# Patient Record
Sex: MALE | Race: WHITE | NOT HISPANIC OR LATINO | URBAN - METROPOLITAN AREA
[De-identification: names, ages, dates, MRNs, and addresses within clinical notes are randomized per-mention and may not be internally consistent; named-entity substitution may affect disease eponyms.]

---

## 2024-10-02 ENCOUNTER — CLAIMS CREATED FROM THE CLAIM WINDOW (OUTPATIENT)
Dept: URBAN - METROPOLITAN AREA MEDICAL CENTER 25 | Facility: MEDICAL CENTER | Age: 64
End: 2024-10-02
Payer: SELF-PAY

## 2024-10-02 ENCOUNTER — LAB OUTSIDE AN ENCOUNTER (OUTPATIENT)
Dept: URBAN - METROPOLITAN AREA CLINIC 19 | Facility: CLINIC | Age: 64
End: 2024-10-02

## 2024-10-02 DIAGNOSIS — R74.8 ABNORMAL LIVER ENZYMES: ICD-10-CM

## 2024-10-02 PROCEDURE — 99254 IP/OBS CNSLTJ NEW/EST MOD 60: CPT | Performed by: INTERNAL MEDICINE

## 2024-10-03 ENCOUNTER — CLAIMS CREATED FROM THE CLAIM WINDOW (OUTPATIENT)
Dept: URBAN - METROPOLITAN AREA MEDICAL CENTER 25 | Facility: MEDICAL CENTER | Age: 64
End: 2024-10-03
Payer: SELF-PAY

## 2024-10-03 DIAGNOSIS — K70.31 ALCOHOLIC CIRRHOSIS OF LIVER WITH ASCITES: ICD-10-CM

## 2024-10-03 DIAGNOSIS — R74.8 ABNORMAL LIVER ENZYMES: ICD-10-CM

## 2024-10-03 PROCEDURE — 99232 SBSQ HOSP IP/OBS MODERATE 35: CPT | Performed by: INTERNAL MEDICINE

## 2024-10-04 ENCOUNTER — CLAIMS CREATED FROM THE CLAIM WINDOW (OUTPATIENT)
Dept: URBAN - METROPOLITAN AREA MEDICAL CENTER 25 | Facility: MEDICAL CENTER | Age: 64
End: 2024-10-04
Payer: SELF-PAY

## 2024-10-04 ENCOUNTER — LAB OUTSIDE AN ENCOUNTER (OUTPATIENT)
Dept: URBAN - METROPOLITAN AREA CLINIC 19 | Facility: CLINIC | Age: 64
End: 2024-10-04

## 2024-10-04 DIAGNOSIS — K70.31 ALCOHOLIC CIRRHOSIS OF LIVER WITH ASCITES: ICD-10-CM

## 2024-10-04 DIAGNOSIS — R74.8 ABNORMAL LIVER ENZYMES: ICD-10-CM

## 2024-10-04 PROCEDURE — 99232 SBSQ HOSP IP/OBS MODERATE 35: CPT | Performed by: INTERNAL MEDICINE

## 2024-10-05 ENCOUNTER — CLAIMS CREATED FROM THE CLAIM WINDOW (OUTPATIENT)
Dept: URBAN - METROPOLITAN AREA MEDICAL CENTER 25 | Facility: MEDICAL CENTER | Age: 64
End: 2024-10-05
Payer: SELF-PAY

## 2024-10-05 DIAGNOSIS — K70.31 ALCOHOLIC CIRRHOSIS OF LIVER WITH ASCITES: ICD-10-CM

## 2024-10-05 DIAGNOSIS — R74.8 ABNORMAL LIVER ENZYMES: ICD-10-CM

## 2024-10-05 PROCEDURE — 99232 SBSQ HOSP IP/OBS MODERATE 35: CPT | Performed by: INTERNAL MEDICINE

## 2024-10-30 ENCOUNTER — DASHBOARD ENCOUNTERS (OUTPATIENT)
Age: 64
End: 2024-10-30

## 2024-10-30 ENCOUNTER — TELEPHONE ENCOUNTER (OUTPATIENT)
Dept: URBAN - METROPOLITAN AREA CLINIC 19 | Facility: CLINIC | Age: 64
End: 2024-10-30

## 2024-10-30 ENCOUNTER — OFFICE VISIT (OUTPATIENT)
Dept: URBAN - METROPOLITAN AREA CLINIC 19 | Facility: CLINIC | Age: 64
End: 2024-10-30
Payer: SELF-PAY

## 2024-10-30 ENCOUNTER — LAB OUTSIDE AN ENCOUNTER (OUTPATIENT)
Dept: URBAN - METROPOLITAN AREA CLINIC 19 | Facility: CLINIC | Age: 64
End: 2024-10-30

## 2024-10-30 VITALS
DIASTOLIC BLOOD PRESSURE: 80 MMHG | WEIGHT: 131.2 LBS | TEMPERATURE: 98.4 F | BODY MASS INDEX: 17.77 KG/M2 | HEIGHT: 72 IN | OXYGEN SATURATION: 100 % | HEART RATE: 98 BPM | SYSTOLIC BLOOD PRESSURE: 110 MMHG

## 2024-10-30 DIAGNOSIS — K70.31 ALCOHOLIC CIRRHOSIS OF LIVER WITH ASCITES: ICD-10-CM

## 2024-10-30 DIAGNOSIS — Z12.11 COLON CANCER SCREENING: ICD-10-CM

## 2024-10-30 PROBLEM — 420054005: Status: ACTIVE | Noted: 2024-10-30

## 2024-10-30 PROCEDURE — 99205 OFFICE O/P NEW HI 60 MIN: CPT | Performed by: INTERNAL MEDICINE

## 2024-10-30 RX ORDER — POLYETHYLENE GLYCOL 3350, SODIUM SULFATE, SODIUM CHLORIDE, POTASSIUM CHLORIDE, ASCORBIC ACID, SODIUM ASCORBATE 140-9-5.2G
AS DIRECTED KIT ORAL
OUTPATIENT
Start: 2024-10-30

## 2024-10-30 RX ORDER — SPIRONOLACTONE 100 MG/1
1 TABLET TABLET, FILM COATED ORAL ONCE A DAY
Qty: 90 TABLET | Refills: 1 | OUTPATIENT
Start: 2024-10-30 | End: 2025-04-28

## 2024-10-30 RX ORDER — ONDANSETRON HYDROCHLORIDE 4 MG/1
1 TABLET TABLET, FILM COATED ORAL ONCE A DAY
Qty: 30 | Refills: 1 | OUTPATIENT
Start: 2024-10-30

## 2024-10-30 RX ORDER — FUROSEMIDE 40 MG/1
1 TABLET TABLET ORAL ONCE A DAY
Qty: 90 TABLET | Refills: 1 | OUTPATIENT
Start: 2024-10-30 | End: 2025-04-28

## 2024-10-30 RX ORDER — LACTULOSE 10 G/15ML
15 ML AS NEEDED SOLUTION ORAL ONCE A DAY
Qty: 1350 ML | Refills: 3 | OUTPATIENT
Start: 2024-10-30 | End: 2025-10-25

## 2024-10-30 NOTE — HPI-TODAY'S VISIT:
Mr. Mitchell is a 64 year old male with history of throat cancer in remission who was admitted 10/2/2024 to 10/7/2024 for decompensated cirrhosis.   On 10/2/2024 CT A/P hepatic protocol showed hepatic cirrhosis and evidence of portal venous hypertension with splenomegaly, collateral vascular formation and a large amount of abdominal and pelvic ascites, anasarca, bilateral pleural effusions, left greater than right.   On 10/4/2024 ascites was consistent with portal hypertension ascites from cirrhosis without SBP.  On 10/4/2024 labs showed ASMA negative, ceruloplasmin 25.3, Iron 65, iron satur 39%,    On 10/7/2024 labs showed AST 70, ALT 36, Bilirubin 5.7, alk phos 261, Albumin 2.6, Cr 0.6, Na 135, INR 1.4, Hgb 8.9, .1, and platelet 140.   He reports his last drink was end of 8/2024.

## 2024-11-06 ENCOUNTER — TELEPHONE ENCOUNTER (OUTPATIENT)
Dept: URBAN - METROPOLITAN AREA CLINIC 98 | Facility: CLINIC | Age: 64
End: 2024-11-06

## 2024-11-06 RX ORDER — SPIRONOLACTONE 100 MG/1
1 TABLET TABLET, FILM COATED ORAL ONCE A DAY
Qty: 90 TABLET | Refills: 1
Start: 2024-10-30 | End: 2025-05-05

## 2024-11-11 ENCOUNTER — TELEPHONE ENCOUNTER (OUTPATIENT)
Dept: URBAN - METROPOLITAN AREA CLINIC 19 | Facility: CLINIC | Age: 64
End: 2024-11-11

## 2024-11-13 ENCOUNTER — TELEPHONE ENCOUNTER (OUTPATIENT)
Dept: URBAN - METROPOLITAN AREA CLINIC 19 | Facility: CLINIC | Age: 64
End: 2024-11-13

## 2024-11-13 RX ORDER — MIDODRINE HYDROCHLORIDE 5 MG/1
1 TABLET HOLD FOR SYSTOLIC BLOOD PRESSURE OVER 140 TABLET ORAL
Qty: 90 | Refills: 0 | OUTPATIENT
Start: 2024-11-13

## 2024-11-13 RX ORDER — POTASSIUM CHLORIDE 1500 MG/1
1 TABLET WITH FOOD TABLET, EXTENDED RELEASE ORAL ONCE A DAY
Qty: 30 | Refills: 0 | OUTPATIENT
Start: 2024-11-13 | End: 2024-12-13

## 2024-11-15 ENCOUNTER — OFFICE VISIT (OUTPATIENT)
Dept: URBAN - METROPOLITAN AREA TELEHEALTH 2 | Facility: TELEHEALTH | Age: 64
End: 2024-11-15
Payer: COMMERCIAL

## 2024-11-15 VITALS — WEIGHT: 144 LBS | HEIGHT: 72 IN | BODY MASS INDEX: 19.5 KG/M2

## 2024-11-15 DIAGNOSIS — K70.31 ALCOHOLIC CIRRHOSIS OF LIVER WITH ASCITES: ICD-10-CM

## 2024-11-15 PROCEDURE — 97802 MEDICAL NUTRITION INDIV IN: CPT | Performed by: DIETITIAN, REGISTERED

## 2024-11-15 RX ORDER — LACTULOSE 10 G/15ML
15 ML AS NEEDED SOLUTION ORAL ONCE A DAY
Qty: 1350 ML | Refills: 3 | Status: ACTIVE | COMMUNITY
Start: 2024-10-30 | End: 2025-10-25

## 2024-11-15 RX ORDER — POTASSIUM CHLORIDE 1500 MG/1
1 TABLET WITH FOOD TABLET, EXTENDED RELEASE ORAL ONCE A DAY
Qty: 30 | Refills: 0 | Status: ACTIVE | COMMUNITY
Start: 2024-11-13 | End: 2024-12-13

## 2024-11-15 RX ORDER — MIDODRINE HYDROCHLORIDE 5 MG/1
1 TABLET HOLD FOR SYSTOLIC BLOOD PRESSURE OVER 140 TABLET ORAL
Qty: 90 | Refills: 0 | Status: ACTIVE | COMMUNITY
Start: 2024-11-13

## 2024-11-15 RX ORDER — POLYETHYLENE GLYCOL 3350, SODIUM SULFATE, SODIUM CHLORIDE, POTASSIUM CHLORIDE, ASCORBIC ACID, SODIUM ASCORBATE 140-9-5.2G
AS DIRECTED KIT ORAL
Status: ACTIVE | COMMUNITY
Start: 2024-10-30

## 2024-11-15 RX ORDER — ONDANSETRON HYDROCHLORIDE 4 MG/1
1 TABLET TABLET, FILM COATED ORAL ONCE A DAY
Qty: 30 | Refills: 1 | Status: ACTIVE | COMMUNITY
Start: 2024-10-30

## 2024-11-15 RX ORDER — FUROSEMIDE 40 MG/1
1 TABLET TABLET ORAL ONCE A DAY
Qty: 90 TABLET | Refills: 1 | Status: ACTIVE | COMMUNITY
Start: 2024-10-30 | End: 2025-04-28

## 2024-11-15 RX ORDER — SPIRONOLACTONE 100 MG/1
1 TABLET TABLET, FILM COATED ORAL ONCE A DAY
Qty: 90 TABLET | Refills: 1 | Status: ACTIVE | COMMUNITY
Start: 2024-10-30 | End: 2025-05-05

## 2024-12-04 ENCOUNTER — OFFICE VISIT (OUTPATIENT)
Dept: URBAN - METROPOLITAN AREA TELEHEALTH 2 | Facility: TELEHEALTH | Age: 64
End: 2024-12-04

## 2024-12-04 RX ORDER — SPIRONOLACTONE 100 MG/1
1 TABLET TABLET, FILM COATED ORAL ONCE A DAY
Qty: 90 TABLET | Refills: 1 | Status: ACTIVE | COMMUNITY
Start: 2024-10-30 | End: 2025-05-05

## 2024-12-04 RX ORDER — FUROSEMIDE 40 MG/1
1 TABLET TABLET ORAL ONCE A DAY
Qty: 90 TABLET | Refills: 1 | Status: ACTIVE | COMMUNITY
Start: 2024-10-30 | End: 2025-04-28

## 2024-12-04 RX ORDER — ONDANSETRON HYDROCHLORIDE 4 MG/1
1 TABLET TABLET, FILM COATED ORAL ONCE A DAY
Qty: 30 | Refills: 1 | Status: ACTIVE | COMMUNITY
Start: 2024-10-30

## 2024-12-04 RX ORDER — MIDODRINE HYDROCHLORIDE 5 MG/1
1 TABLET HOLD FOR SYSTOLIC BLOOD PRESSURE OVER 140 TABLET ORAL
Qty: 90 | Refills: 0 | Status: ACTIVE | COMMUNITY
Start: 2024-11-13

## 2024-12-04 RX ORDER — POTASSIUM CHLORIDE 1500 MG/1
1 TABLET WITH FOOD TABLET, EXTENDED RELEASE ORAL ONCE A DAY
Qty: 30 | Refills: 0 | Status: ACTIVE | COMMUNITY
Start: 2024-11-13 | End: 2024-12-13

## 2024-12-04 RX ORDER — LACTULOSE 10 G/15ML
15 ML AS NEEDED SOLUTION ORAL ONCE A DAY
Qty: 1350 ML | Refills: 3 | Status: ACTIVE | COMMUNITY
Start: 2024-10-30 | End: 2025-10-25

## 2024-12-04 RX ORDER — POLYETHYLENE GLYCOL 3350, SODIUM SULFATE, SODIUM CHLORIDE, POTASSIUM CHLORIDE, ASCORBIC ACID, SODIUM ASCORBATE 140-9-5.2G
AS DIRECTED KIT ORAL
Status: ACTIVE | COMMUNITY
Start: 2024-10-30

## 2024-12-11 ENCOUNTER — TELEPHONE ENCOUNTER (OUTPATIENT)
Dept: URBAN - METROPOLITAN AREA CLINIC 19 | Facility: CLINIC | Age: 64
End: 2024-12-11

## 2024-12-11 ENCOUNTER — OFFICE VISIT (OUTPATIENT)
Dept: URBAN - METROPOLITAN AREA TELEHEALTH 2 | Facility: TELEHEALTH | Age: 64
End: 2024-12-11
Payer: COMMERCIAL

## 2024-12-11 DIAGNOSIS — K70.31 ALCOHOLIC CIRRHOSIS OF LIVER WITH ASCITES: ICD-10-CM

## 2024-12-11 PROCEDURE — 97803 MED NUTRITION INDIV SUBSEQ: CPT | Performed by: DIETITIAN, REGISTERED

## 2024-12-11 RX ORDER — POTASSIUM CHLORIDE 1500 MG/1
1 TABLET WITH FOOD TABLET, EXTENDED RELEASE ORAL ONCE A DAY
Qty: 30 | Refills: 0 | Status: ACTIVE | COMMUNITY
Start: 2024-11-13 | End: 2024-12-13

## 2024-12-11 RX ORDER — SPIRONOLACTONE 100 MG/1
1 TABLET TABLET, FILM COATED ORAL ONCE A DAY
Qty: 90 TABLET | Refills: 1 | Status: ACTIVE | COMMUNITY
Start: 2024-10-30 | End: 2025-05-05

## 2024-12-11 RX ORDER — MIDODRINE HYDROCHLORIDE 5 MG/1
1 TABLET HOLD FOR SYSTOLIC BLOOD PRESSURE OVER 140 TABLET ORAL
Qty: 90 | Refills: 0 | Status: ACTIVE | COMMUNITY
Start: 2024-11-13

## 2024-12-11 RX ORDER — POLYETHYLENE GLYCOL 3350, SODIUM SULFATE, SODIUM CHLORIDE, POTASSIUM CHLORIDE, ASCORBIC ACID, SODIUM ASCORBATE 140-9-5.2G
AS DIRECTED KIT ORAL
Status: ACTIVE | COMMUNITY
Start: 2024-10-30

## 2024-12-11 RX ORDER — FUROSEMIDE 40 MG/1
1 TABLET TABLET ORAL ONCE A DAY
Qty: 90 TABLET | Refills: 1 | Status: ACTIVE | COMMUNITY
Start: 2024-10-30 | End: 2025-04-28

## 2024-12-11 RX ORDER — ONDANSETRON HYDROCHLORIDE 4 MG/1
1 TABLET TABLET, FILM COATED ORAL ONCE A DAY
Qty: 30 | Refills: 1 | Status: ACTIVE | COMMUNITY
Start: 2024-10-30

## 2024-12-11 RX ORDER — LACTULOSE 10 G/15ML
15 ML AS NEEDED SOLUTION ORAL ONCE A DAY
Qty: 1350 ML | Refills: 3 | Status: ACTIVE | COMMUNITY
Start: 2024-10-30 | End: 2025-10-25

## 2024-12-12 ENCOUNTER — OFFICE VISIT (OUTPATIENT)
Dept: URBAN - METROPOLITAN AREA MEDICAL CENTER 25 | Facility: MEDICAL CENTER | Age: 64
End: 2024-12-12
Payer: COMMERCIAL

## 2024-12-12 DIAGNOSIS — D12.2 ADENOMA OF ASCENDING COLON: ICD-10-CM

## 2024-12-12 DIAGNOSIS — K31.89 ACHYLIA: ICD-10-CM

## 2024-12-12 DIAGNOSIS — Z12.11 COLON CANCER SCREENING: ICD-10-CM

## 2024-12-12 DIAGNOSIS — D12.4 ADENOMA OF DESCENDING COLON: ICD-10-CM

## 2024-12-12 DIAGNOSIS — K74.69 CIRRHOSIS, CRYPTOGENIC: ICD-10-CM

## 2024-12-12 DIAGNOSIS — D12.8 ADENOMATOUS POLYP OF RECTUM: ICD-10-CM

## 2024-12-12 DIAGNOSIS — K76.6 CLINICALLY SIGNIFICANT PORTAL HYPERTENSION: ICD-10-CM

## 2024-12-12 PROCEDURE — 43239 EGD BIOPSY SINGLE/MULTIPLE: CPT | Performed by: INTERNAL MEDICINE

## 2024-12-12 PROCEDURE — 45385 COLONOSCOPY W/LESION REMOVAL: CPT | Performed by: INTERNAL MEDICINE

## 2024-12-12 RX ORDER — SPIRONOLACTONE 100 MG/1
1 TABLET TABLET, FILM COATED ORAL ONCE A DAY
Qty: 90 TABLET | Refills: 1 | Status: ACTIVE | COMMUNITY
Start: 2024-10-30 | End: 2025-05-05

## 2024-12-12 RX ORDER — MIDODRINE HYDROCHLORIDE 5 MG/1
1 TABLET HOLD FOR SYSTOLIC BLOOD PRESSURE OVER 140 TABLET ORAL
Qty: 90 | Refills: 0 | Status: ACTIVE | COMMUNITY
Start: 2024-11-13

## 2024-12-12 RX ORDER — ONDANSETRON HYDROCHLORIDE 4 MG/1
1 TABLET TABLET, FILM COATED ORAL ONCE A DAY
Qty: 30 | Refills: 1 | Status: ACTIVE | COMMUNITY
Start: 2024-10-30

## 2024-12-12 RX ORDER — FUROSEMIDE 40 MG/1
1 TABLET TABLET ORAL ONCE A DAY
Qty: 90 TABLET | Refills: 1 | Status: ACTIVE | COMMUNITY
Start: 2024-10-30 | End: 2025-04-28

## 2024-12-12 RX ORDER — LACTULOSE 10 G/15ML
15 ML AS NEEDED SOLUTION ORAL ONCE A DAY
Qty: 1350 ML | Refills: 3 | Status: ACTIVE | COMMUNITY
Start: 2024-10-30 | End: 2025-10-25

## 2024-12-12 RX ORDER — POTASSIUM CHLORIDE 1500 MG/1
1 TABLET WITH FOOD TABLET, EXTENDED RELEASE ORAL ONCE A DAY
Qty: 30 | Refills: 0 | Status: ACTIVE | COMMUNITY
Start: 2024-11-13 | End: 2024-12-13

## 2024-12-12 RX ORDER — POLYETHYLENE GLYCOL 3350, SODIUM SULFATE, SODIUM CHLORIDE, POTASSIUM CHLORIDE, ASCORBIC ACID, SODIUM ASCORBATE 140-9-5.2G
AS DIRECTED KIT ORAL
Status: ACTIVE | COMMUNITY
Start: 2024-10-30

## 2024-12-14 ENCOUNTER — TELEPHONE ENCOUNTER (OUTPATIENT)
Dept: URBAN - METROPOLITAN AREA CLINIC 19 | Facility: CLINIC | Age: 64
End: 2024-12-14

## 2025-01-10 ENCOUNTER — OFFICE VISIT (OUTPATIENT)
Dept: URBAN - METROPOLITAN AREA CLINIC 19 | Facility: CLINIC | Age: 65
End: 2025-01-10

## 2025-03-03 ENCOUNTER — LAB OUTSIDE AN ENCOUNTER (OUTPATIENT)
Dept: URBAN - METROPOLITAN AREA CLINIC 19 | Facility: CLINIC | Age: 65
End: 2025-03-03

## 2025-03-03 ENCOUNTER — OFFICE VISIT (OUTPATIENT)
Dept: URBAN - METROPOLITAN AREA CLINIC 19 | Facility: CLINIC | Age: 65
End: 2025-03-03
Payer: COMMERCIAL

## 2025-03-03 VITALS
WEIGHT: 142.6 LBS | BODY MASS INDEX: 19.31 KG/M2 | SYSTOLIC BLOOD PRESSURE: 115 MMHG | TEMPERATURE: 98.6 F | DIASTOLIC BLOOD PRESSURE: 80 MMHG | HEIGHT: 72 IN

## 2025-03-03 DIAGNOSIS — K70.31 ALCOHOLIC CIRRHOSIS OF LIVER WITH ASCITES: ICD-10-CM

## 2025-03-03 DIAGNOSIS — Z86.0100 PERSONAL HISTORY OF COLONIC POLYPS: ICD-10-CM

## 2025-03-03 DIAGNOSIS — R76.8 HEPATITIS B CORE ANTIBODY POSITIVE: ICD-10-CM

## 2025-03-03 PROCEDURE — 99215 OFFICE O/P EST HI 40 MIN: CPT | Performed by: INTERNAL MEDICINE

## 2025-03-03 RX ORDER — ONDANSETRON HYDROCHLORIDE 4 MG/1
1 TABLET TABLET, FILM COATED ORAL ONCE A DAY
Qty: 30 | Refills: 1 | Status: ACTIVE | COMMUNITY
Start: 2024-10-30

## 2025-03-03 RX ORDER — POLYETHYLENE GLYCOL 3350, SODIUM SULFATE, SODIUM CHLORIDE, POTASSIUM CHLORIDE, ASCORBIC ACID, SODIUM ASCORBATE 140-9-5.2G
AS DIRECTED KIT ORAL
Status: ACTIVE | COMMUNITY
Start: 2024-10-30

## 2025-03-03 RX ORDER — SPIRONOLACTONE 50 MG/1
1 TABLET TABLET, FILM COATED ORAL ONCE A DAY
Qty: 90 TABLET | Refills: 3 | OUTPATIENT
Start: 2025-03-03 | End: 2026-02-26

## 2025-03-03 RX ORDER — MIDODRINE HYDROCHLORIDE 5 MG/1
1 TABLET HOLD FOR SYSTOLIC BLOOD PRESSURE OVER 140 TABLET ORAL
Qty: 90 | Refills: 0 | Status: ACTIVE | COMMUNITY
Start: 2024-11-13

## 2025-03-03 RX ORDER — LACTULOSE 10 G/15ML
15 ML AS NEEDED SOLUTION ORAL ONCE A DAY
Qty: 1350 ML | Refills: 3 | Status: ACTIVE | COMMUNITY
Start: 2024-10-30 | End: 2025-10-25

## 2025-03-03 RX ORDER — FUROSEMIDE 40 MG/1
1 TABLET TABLET ORAL ONCE A DAY
Qty: 90 TABLET | Refills: 1 | Status: ACTIVE | COMMUNITY
Start: 2024-10-30 | End: 2025-04-28

## 2025-03-03 RX ORDER — SPIRONOLACTONE 100 MG/1
1 TABLET TABLET, FILM COATED ORAL ONCE A DAY
Qty: 90 TABLET | Refills: 1 | Status: ACTIVE | COMMUNITY
Start: 2024-10-30 | End: 2025-05-05

## 2025-03-03 RX ORDER — FUROSEMIDE 20 MG/1
1 TABLET TABLET ORAL ONCE A DAY
Qty: 90 TABLET | Refills: 3 | OUTPATIENT
Start: 2025-03-03 | End: 2026-02-26

## 2025-03-03 NOTE — HPI-TODAY'S VISIT:
Mr. Mitchell is a 64 year old male with throat cancer in remission on 10/30/2024 for decompensated cirrhosis.   On 10/30/2024 labs showed WBC 9.6, Hgb 11.9, Plt 172, INR 1.1, Cr 0.82, Alb 3.2, AST 63, ALT 34, T bili 5.8, Alk phos 315, Albumin 3.2, AFP 5.0 (normal <6.1), Hep A Ab reactive, Hep B s Ag nonreactive Hep B c Ab reactive, Hep B s Ab reactive, Hepatitis C nonreactive, A1AT phenotype MM.   On 12/11/2024 he saw dietician Ana who helped with low sodium nutrition dense diet.   On 12/12/2024 he had an EGD and colonoscopy performed. On 12/12/2024 EGD showed moderate portal hypertensive gastropathy and mild erythema in the gastric antrum. The colonoscopy wshowed a 7mm rectal polyp, 7mm descending colon polyp, and 4mm ascending colon polyp.   Pathology of the gastric biopsies were negative for H. pylori, pathology of the ascending colon polyp showed a sessile serrated adenoma, descending colon polyp was a tubular adenoma, and the rectal polyp was a tubullovillous adenoma.   Repeat colonoscopy was recommended in 3 years.   Prior history is summarized below: -He reports his last drink was end of 8/2024. -On 10/2/2024 CT A/P hepatic protocol showed hepatic cirrhosis and evidence of portal venous hypertension with splenomegaly, collateral vascular formation and a large amount of abdominal and pelvic ascites, anasarca, bilateral pleural effusions, left greater than right.  -On 10/4/2024 ascites was consistent with portal hypertension ascites from cirrhosis without SBP. -On 10/4/2024 labs showed ASMA negative, ceruloplasmin 25.3, Iron 65, iron sat 39%,    -On 10/7/2024 labs showed AST 70, ALT 36, Bilirubin 5.7, alk phos 261, Albumin 2.6, Cr 0.6, Na 135, INR 1.4, Hgb 8.9, .1, and platelet 140.

## 2025-03-05 LAB
A/G RATIO: 1.2
AFP, SERUM, TUMOR MARKER: 5.5
ALBUMIN: 3.7
ALKALINE PHOSPHATASE: 220
ALT (SGPT): 50
AST (SGOT): 70
BILIRUBIN, TOTAL: 3.1
BUN/CREATININE RATIO: (no result)
BUN: 12
CALCIUM: 9.3
CARBON DIOXIDE, TOTAL: 26
CHLORIDE: 99
CREATININE: 0.8
EGFR: 99
GLOBULIN, TOTAL: 3
GLUCOSE: 94
HEMATOCRIT: 39.8
HEMOGLOBIN: 13.8
HEPATITIS B VIRUS DNA: NOT DETECTED
HEPATITIS B VIRUS DNA: NOT DETECTED
INR: 1
MCH: 32.3
MCHC: 34.7
MCV: 93.2
MPV: 10.4
PLATELET COUNT: 129
POTASSIUM: 3.9
PROTEIN, TOTAL: 6.7
PT: 10.8
RDW: 13
RED BLOOD CELL COUNT: 4.27
SODIUM: 136
WHITE BLOOD CELL COUNT: 6.9

## 2025-03-10 ENCOUNTER — OFFICE VISIT (OUTPATIENT)
Dept: URBAN - METROPOLITAN AREA CLINIC 19 | Facility: CLINIC | Age: 65
End: 2025-03-10

## 2025-03-21 ENCOUNTER — OFFICE VISIT (OUTPATIENT)
Dept: URBAN - METROPOLITAN AREA CLINIC 18 | Facility: CLINIC | Age: 65
End: 2025-03-21
Payer: COMMERCIAL

## 2025-03-21 DIAGNOSIS — K70.31 ALCOHOLIC CIRRHOSIS OF LIVER WITH ASCITES: ICD-10-CM

## 2025-03-21 DIAGNOSIS — K82.4 GALL BLADDER POLYP: ICD-10-CM

## 2025-03-21 DIAGNOSIS — K82.8 THICKENING OF WALL OF GALLBLADDER: ICD-10-CM

## 2025-03-21 PROCEDURE — 76705 ECHO EXAM OF ABDOMEN: CPT | Performed by: INTERNAL MEDICINE

## 2025-03-21 RX ORDER — FUROSEMIDE 40 MG/1
1 TABLET TABLET ORAL ONCE A DAY
Qty: 90 TABLET | Refills: 1 | Status: ACTIVE | COMMUNITY
Start: 2024-10-30 | End: 2025-04-28

## 2025-03-21 RX ORDER — FUROSEMIDE 20 MG/1
1 TABLET TABLET ORAL ONCE A DAY
Qty: 90 TABLET | Refills: 3 | Status: ACTIVE | COMMUNITY
Start: 2025-03-03 | End: 2026-02-26

## 2025-03-21 RX ORDER — SPIRONOLACTONE 50 MG/1
1 TABLET TABLET, FILM COATED ORAL ONCE A DAY
Qty: 90 TABLET | Refills: 3 | Status: ACTIVE | COMMUNITY
Start: 2025-03-03 | End: 2026-02-26

## 2025-03-21 RX ORDER — LACTULOSE 10 G/15ML
15 ML AS NEEDED SOLUTION ORAL ONCE A DAY
Qty: 1350 ML | Refills: 3 | Status: ACTIVE | COMMUNITY
Start: 2024-10-30 | End: 2025-10-25

## 2025-03-21 RX ORDER — MIDODRINE HYDROCHLORIDE 5 MG/1
1 TABLET HOLD FOR SYSTOLIC BLOOD PRESSURE OVER 140 TABLET ORAL
Qty: 90 | Refills: 0 | Status: ACTIVE | COMMUNITY
Start: 2024-11-13

## 2025-03-21 RX ORDER — POLYETHYLENE GLYCOL 3350, SODIUM SULFATE, SODIUM CHLORIDE, POTASSIUM CHLORIDE, ASCORBIC ACID, SODIUM ASCORBATE 140-9-5.2G
AS DIRECTED KIT ORAL
Status: ACTIVE | COMMUNITY
Start: 2024-10-30

## 2025-03-21 RX ORDER — SPIRONOLACTONE 100 MG/1
1 TABLET TABLET, FILM COATED ORAL ONCE A DAY
Qty: 90 TABLET | Refills: 1 | Status: ACTIVE | COMMUNITY
Start: 2024-10-30 | End: 2025-05-05

## 2025-03-21 RX ORDER — ONDANSETRON HYDROCHLORIDE 4 MG/1
1 TABLET TABLET, FILM COATED ORAL ONCE A DAY
Qty: 30 | Refills: 1 | Status: ACTIVE | COMMUNITY
Start: 2024-10-30

## 2025-04-04 ENCOUNTER — OFFICE VISIT (OUTPATIENT)
Dept: URBAN - METROPOLITAN AREA CLINIC 19 | Facility: CLINIC | Age: 65
End: 2025-04-04
Payer: COMMERCIAL

## 2025-04-04 DIAGNOSIS — K70.30 ALCOHOLIC CIRRHOSIS: ICD-10-CM

## 2025-04-04 DIAGNOSIS — R12 HEARTBURN: ICD-10-CM

## 2025-04-04 PROCEDURE — 99213 OFFICE O/P EST LOW 20 MIN: CPT | Performed by: NURSE PRACTITIONER

## 2025-04-04 RX ORDER — SPIRONOLACTONE 100 MG/1
1 TABLET TABLET, FILM COATED ORAL ONCE A DAY
Qty: 90 TABLET | Refills: 1 | Status: ACTIVE | COMMUNITY
Start: 2024-10-30 | End: 2025-05-05

## 2025-04-04 RX ORDER — FUROSEMIDE 40 MG/1
1 TABLET TABLET ORAL ONCE A DAY
Qty: 90 TABLET | Refills: 1 | Status: ACTIVE | COMMUNITY
Start: 2024-10-30 | End: 2025-04-28

## 2025-04-04 RX ORDER — MIDODRINE HYDROCHLORIDE 5 MG/1
1 TABLET HOLD FOR SYSTOLIC BLOOD PRESSURE OVER 140 TABLET ORAL
Qty: 90 | Refills: 0 | Status: ON HOLD | COMMUNITY
Start: 2024-11-13

## 2025-04-04 RX ORDER — PANTOPRAZOLE SODIUM 20 MG/1
1 TABLET 1/2 TO 1 HOUR BEFORE MORNING MEAL TABLET, DELAYED RELEASE ORAL ONCE A DAY
Qty: 90 TABLET | Refills: 3 | OUTPATIENT
Start: 2025-04-04

## 2025-04-04 RX ORDER — LACTULOSE 10 G/15ML
15 ML AS NEEDED SOLUTION ORAL ONCE A DAY
Qty: 1350 ML | Refills: 3 | Status: ON HOLD | COMMUNITY
Start: 2024-10-30 | End: 2025-10-25

## 2025-04-04 RX ORDER — ONDANSETRON HYDROCHLORIDE 4 MG/1
1 TABLET TABLET, FILM COATED ORAL ONCE A DAY
Qty: 30 | Refills: 1 | Status: ON HOLD | COMMUNITY
Start: 2024-10-30

## 2025-04-04 NOTE — HPI-TODAY'S VISIT:
Mr. Mitchell is a 64 year old male with throat cancer in remission, cirrhosis who presents for follow-up.  Last seen 3/3/2025 with Dr. Hidalgo.  3/3/2025 labs: AFP negative.  WBC 6.9, Hg 13.8, , INR 1.0, T. bili 3.1, , AST 70, ALT 50.  Hepatitis B DNA PCR negative NaMELD = 11   3/21/2025 RUQ US: Cirrhosis, no suspicious liver lesion.  Small 3 mm pedunculated gallbladder polyp versus adherent gallstone without suspicious features.  Mild diffuse gallbladder wall thickening likely secondary to liver disease and/or underdistention.  He ran out of Pantoprazole last month and has been experiencing more heartburn lately. Has been maintaining low sodium diet, no etoh, compliant with diuretics. No swelling or jaundice. No abdominal pain. Overall feels well.        Prior history is summarized below: -He reports his last drink was end of 8/2024. -On 10/2/2024 CT A/P hepatic protocol showed hepatic cirrhosis and evidence of portal venous hypertension with splenomegaly, collateral vascular formation and a large amount of abdominal and pelvic ascites, anasarca, bilateral pleural effusions, left greater than right.  -On 10/4/2024 ascites was consistent with portal hypertension ascites from cirrhosis without SBP. -On 10/4/2024 labs showed ASMA negative, ceruloplasmin 25.3, Iron 65, iron sat 39%,    -On 10/7/2024 labs showed AST 70, ALT 36, Bilirubin 5.7, alk phos 261, Albumin 2.6, Cr 0.6, Na 135, INR 1.4, Hgb 8.9, .1, and platelet 140. On 10/30/2024 labs showed WBC 9.6, Hgb 11.9, Plt 172, INR 1.1, Cr 0.82, Alb 3.2, AST 63, ALT 34, T bili 5.8, Alk phos 315, Albumin 3.2, AFP 5.0 (normal <6.1), Hep A Ab reactive, Hep B s Ag nonreactive Hep B c Ab reactive, Hep B s Ab reactive, Hepatitis C nonreactive, A1AT phenotype MM.   On 12/11/2024 he saw dietician Ana who helped with low sodium nutrition dense diet.   On 12/12/2024 he had an EGD and colonoscopy performed. On 12/12/2024 EGD showed moderate portal hypertensive gastropathy and mild erythema in the gastric antrum. The colonoscopy wshowed a 7mm rectal polyp, 7mm descending colon polyp, and 4mm ascending colon polyp.   Pathology of the gastric biopsies were negative for H. pylori, pathology of the ascending colon polyp showed a sessile serrated adenoma, descending colon polyp was a tubular adenoma, and the rectal polyp was a tubullovillous adenoma.   Repeat colonoscopy was recommended in 3 years..

## 2025-05-27 ENCOUNTER — TELEPHONE ENCOUNTER (OUTPATIENT)
Dept: URBAN - METROPOLITAN AREA CLINIC 19 | Facility: CLINIC | Age: 65
End: 2025-05-27

## 2025-06-02 ENCOUNTER — OFFICE VISIT (OUTPATIENT)
Dept: URBAN - METROPOLITAN AREA CLINIC 19 | Facility: CLINIC | Age: 65
End: 2025-06-02

## 2025-06-16 ENCOUNTER — OFFICE VISIT (OUTPATIENT)
Dept: URBAN - METROPOLITAN AREA CLINIC 19 | Facility: CLINIC | Age: 65
End: 2025-06-16

## 2025-06-27 ENCOUNTER — OFFICE VISIT (OUTPATIENT)
Dept: URBAN - METROPOLITAN AREA CLINIC 19 | Facility: CLINIC | Age: 65
End: 2025-06-27
Payer: COMMERCIAL

## 2025-06-27 DIAGNOSIS — L29.9 PRURITIS: ICD-10-CM

## 2025-06-27 DIAGNOSIS — R12 HEARTBURN: ICD-10-CM

## 2025-06-27 DIAGNOSIS — K70.30 ALCOHOLIC CIRRHOSIS: ICD-10-CM

## 2025-06-27 PROCEDURE — 99215 OFFICE O/P EST HI 40 MIN: CPT | Performed by: NURSE PRACTITIONER

## 2025-06-27 RX ORDER — PANTOPRAZOLE SODIUM 20 MG/1
1 TABLET 1/2 TO 1 HOUR BEFORE MORNING MEAL TABLET, DELAYED RELEASE ORAL ONCE A DAY
Qty: 90 TABLET | Refills: 3 | Status: ACTIVE | COMMUNITY
Start: 2025-04-04

## 2025-06-27 RX ORDER — MIDODRINE HYDROCHLORIDE 5 MG/1
1 TABLET HOLD FOR SYSTOLIC BLOOD PRESSURE OVER 140 TABLET ORAL
Qty: 90 | Refills: 0 | Status: ON HOLD | COMMUNITY
Start: 2024-11-13

## 2025-06-27 RX ORDER — HYDROXYZINE HYDROCHLORIDE 25 MG/1
1 TABLET AS NEEDED TABLET, FILM COATED ORAL ONCE A DAY
Qty: 14 TABLET | Refills: 0 | OUTPATIENT
Start: 2025-06-27

## 2025-06-27 RX ORDER — LACTULOSE 10 G/15ML
15 ML AS NEEDED SOLUTION ORAL ONCE A DAY
Qty: 1350 ML | Refills: 3 | Status: ON HOLD | COMMUNITY
Start: 2024-10-30 | End: 2025-10-25

## 2025-06-27 RX ORDER — PANTOPRAZOLE SODIUM 20 MG/1
1 TABLET 1/2 TO 1 HOUR BEFORE MORNING MEAL TABLET, DELAYED RELEASE ORAL ONCE A DAY
Qty: 90 TABLET | Refills: 3
Start: 2025-04-04

## 2025-06-27 RX ORDER — ONDANSETRON HYDROCHLORIDE 4 MG/1
1 TABLET TABLET, FILM COATED ORAL ONCE A DAY
Qty: 30 | Refills: 1 | Status: ON HOLD | COMMUNITY
Start: 2024-10-30

## 2025-06-27 NOTE — HPI-TODAY'S VISIT:
Mr. Mitchell is a 64 year old male with throat cancer in remission, cirrhosis who presents for follow-up.  Last seen in GI clinic 4/4/25. He had f/u scheduled with Dr. Hidalgo but had to rescedule to being with me today b/c he is leaving for a trip out of town this weekend and he wanted to make sure he followed up before he leaves. He continues on pantoprazole 20 mg/day - he reports no further heartburn since starting this.  He continues on furosemide/spironolactone (20/50) He stopped lactulose He is having a BM daily.  He was treated for shingles last month which has all resolved. Except he does experience some itching still there worse at night to that area.  He continues low sodium diet, staying active. No jaundince, no swelling.      Prior history is summarized below: -He reports his last drink was end of 8/2024. -On 10/2/2024 CT A/P hepatic protocol showed hepatic cirrhosis and evidence of portal venous hypertension with splenomegaly, collateral vascular formation and a large amount of abdominal and pelvic ascites, anasarca, bilateral pleural effusions, left greater than right.  -On 10/4/2024 ascites was consistent with portal hypertension ascites from cirrhosis without SBP. -On 10/4/2024 labs showed ASMA negative, ceruloplasmin 25.3, Iron 65, iron sat 39%,    -On 10/7/2024 labs showed AST 70, ALT 36, Bilirubin 5.7, alk phos 261, Albumin 2.6, Cr 0.6, Na 135, INR 1.4, Hgb 8.9, .1, and platelet 140. On 10/30/2024 labs showed WBC 9.6, Hgb 11.9, Plt 172, INR 1.1, Cr 0.82, Alb 3.2, AST 63, ALT 34, T bili 5.8, Alk phos 315, Albumin 3.2, AFP 5.0 (normal <6.1), Hep A Ab reactive, Hep B s Ag nonreactive Hep B c Ab reactive, Hep B s Ab reactive, Hepatitis C nonreactive, A1AT phenotype MM.  -On 12/11/2024 he saw dietician Ana who helped with low sodium nutrition dense diet.  -On 12/12/2024 EGD and colonoscopy performed. EGD showed moderate portal hypertensive gastropathy and mild erythema in the gastric antrum. The colonoscopy wshowed a 7mm rectal polyp, 7mm descending colon polyp, and 4mm ascending colon polyp.  Pathology of the gastric biopsies were negative for H. pylori, pathology of the ascending colon polyp showed a sessile serrated adenoma, descending colon polyp was a tubular adenoma, and the rectal polyp was a tubullovillous adenoma. Repeat colonoscopy was recommended in 3 years. -On 3/3/2025 labs: AFP negative. WBC 6.9, Hg 13.8, , INR 1.0, T. bili 3.1, , AST 70, ALT 50. Hepatitis B DNA PCR negative. NaMELD = 11 -3/21/2025 RUQ US: Cirrhosis, no suspicious liver lesion. Small 3 mm pedunculated gallbladder polyp versus adherent gallstone without suspicious features. Mild diffuse gallbladder wall thickening likely secondary to liver disease and/or underdistention.

## 2025-06-30 ENCOUNTER — OFFICE VISIT (OUTPATIENT)
Dept: URBAN - METROPOLITAN AREA CLINIC 19 | Facility: CLINIC | Age: 65
End: 2025-06-30

## 2025-06-30 LAB
A/G RATIO: 1.3
AFP, SERUM, TUMOR MARKER: 5.1
ALBUMIN: 3.8
ALKALINE PHOSPHATASE: 228
ALT (SGPT): 54
AST (SGOT): 74
BILIRUBIN, TOTAL: 2.2
BUN/CREATININE RATIO: (no result)
BUN: 9
CALCIUM: 9.2
CARBON DIOXIDE, TOTAL: 23
CHLORIDE: 104
CREATININE: 0.79
EGFR: 100
GLOBULIN, TOTAL: 2.9
GLUCOSE: 105
HEMATOCRIT: 40.5
HEMOGLOBIN: 13.6
INR: 1
MCH: 31.6
MCHC: 33.6
MCV: 94
MPV: 10.5
PLATELET COUNT: 102
POTASSIUM: 4.1
PROTEIN, TOTAL: 6.7
PT: 10.8
RDW: 13
RED BLOOD CELL COUNT: 4.31
SODIUM: 138
WHITE BLOOD CELL COUNT: 5.1